# Patient Record
Sex: FEMALE | Race: WHITE | Employment: FULL TIME | URBAN - METROPOLITAN AREA
[De-identification: names, ages, dates, MRNs, and addresses within clinical notes are randomized per-mention and may not be internally consistent; named-entity substitution may affect disease eponyms.]

---

## 2017-01-09 ENCOUNTER — OFFICE VISIT (OUTPATIENT)
Dept: OPHTHALMOLOGY | Facility: CLINIC | Age: 32
End: 2017-01-09
Attending: OPHTHALMOLOGY
Payer: COMMERCIAL

## 2017-01-09 DIAGNOSIS — H40.51X2 GLAUCOMA ASSOCIATED WITH OCULAR DISORDER, RIGHT, MODERATE STAGE: ICD-10-CM

## 2017-01-09 PROCEDURE — 99212 OFFICE O/P EST SF 10 MIN: CPT | Mod: ZF

## 2017-01-09 ASSESSMENT — CUP TO DISC RATIO: OS_RATIO: 0.1

## 2017-01-09 ASSESSMENT — SLIT LAMP EXAM - LIDS
COMMENTS: NORMAL
COMMENTS: NORMAL

## 2017-01-09 ASSESSMENT — REFRACTION_WEARINGRX
OS_CYLINDER: +0.25
OS_AXIS: 022
OD_SPHERE: +0.00
OS_SPHERE: -4.75
OD_CYLINDER: +0.00
OD_AXIS: 0

## 2017-01-09 ASSESSMENT — VISUAL ACUITY
OS_CC: 20/20
METHOD: SNELLEN - LINEAR
CORRECTION_TYPE: GLASSES

## 2017-01-09 ASSESSMENT — TONOMETRY
OD_IOP_MMHG: 18
OS_IOP_MMHG: 18
IOP_METHOD: APPLANATION

## 2017-01-09 NOTE — PROGRESS NOTES
30 year old with congenital cataract right eye and aphakic glaucoma  Trabeculectomy right eye age 12-14  Current records do not go far enough back to cover surgeries done on right eye   Has been on drops right eye for years with good intraocular pressure and stable visual field   Drops stopped for successful pregnancy, still nursing, daughter about 13 months old  She will continue off drops (Alphagan twice a day and Timolol once a day right eye )   RV 6 months with LVC right eye and GTOP left eye     Attending Physician Attestation:  Complete documentation of historical and exam elements from today's encounter can be found in the full encounter summary report (not reduplicated in this progress note). I personally obtained the chief complaint(s) and history of present illness. I confirmed and edited asnecessary the review of systems, past medical/surgical history, family history, social history, and examination findings as documented by others; and I examined the patient myself. I personally reviewed the relevant tests, images, and reports as documented above. I formulated and edited as necessary the assessment and plan and discussed the findings and management plan with the patient and family.  - Marcela Gross MD 3:06 PM 1/9/2017

## 2017-01-09 NOTE — NURSING NOTE
Chief Complaints and History of Present Illnesses   Patient presents with     Glaucoma Follow Up     HPI    Last Eye Exam:  10/23/15   Affected eye(s):  Both   Symptoms:        Duration:  1 year   Frequency:  Constant       Do you have eye pain now?:  No      Comments:  Pt returns for 1 year follow up. Pt notes that she has not been in last year, had baby ( girl ) and ready to get back on track with eyes. Vision remains stable, denies any visual changes or complaints. BE feeling good and comfortable, currently not using any drops. YUE CURRIE, COA 2:05 PM 01/09/2017

## 2017-07-10 ENCOUNTER — OFFICE VISIT (OUTPATIENT)
Dept: OPHTHALMOLOGY | Facility: CLINIC | Age: 32
End: 2017-07-10
Attending: OPHTHALMOLOGY
Payer: COMMERCIAL

## 2017-07-10 DIAGNOSIS — H40.51X2 GLAUCOMA ASSOCIATED WITH OCULAR DISORDER, RIGHT, MODERATE STAGE: ICD-10-CM

## 2017-07-10 DIAGNOSIS — H40.1190: Primary | ICD-10-CM

## 2017-07-10 PROCEDURE — 99213 OFFICE O/P EST LOW 20 MIN: CPT | Mod: ZF

## 2017-07-10 PROCEDURE — 92083 EXTENDED VISUAL FIELD XM: CPT | Mod: ZF | Performed by: OPHTHALMOLOGY

## 2017-07-10 RX ORDER — ETONOGESTREL AND ETHINYL ESTRADIOL VAGINAL RING .015; .12 MG/D; MG/D
1 RING VAGINAL
COMMUNITY
Start: 2017-06-29 | End: 2018-06-29

## 2017-07-10 ASSESSMENT — TONOMETRY
OD_IOP_MMHG: 21
IOP_METHOD: APPLANATION
OS_IOP_MMHG: 18

## 2017-07-10 ASSESSMENT — VISUAL ACUITY
OS_CC: 20/20
METHOD: SNELLEN - LINEAR
CORRECTION_TYPE: GLASSES

## 2017-07-10 ASSESSMENT — SLIT LAMP EXAM - LIDS
COMMENTS: NORMAL
COMMENTS: NORMAL

## 2017-07-10 ASSESSMENT — CONF VISUAL FIELD
OD_INFERIOR_NASAL_RESTRICTION: 3
OS_NORMAL: 1
OD_INFERIOR_TEMPORAL_RESTRICTION: 3
OD_SUPERIOR_TEMPORAL_RESTRICTION: 3
OD_SUPERIOR_NASAL_RESTRICTION: 3

## 2017-07-10 ASSESSMENT — CUP TO DISC RATIO: OS_RATIO: 0.1

## 2017-07-10 ASSESSMENT — REFRACTION_WEARINGRX
OD_SPHERE: +0.00
OS_SPHERE: -4.75
OS_AXIS: 022
OD_CYLINDER: +0.00
OD_AXIS: 0
OS_CYLINDER: +0.25

## 2017-07-10 NOTE — MR AVS SNAPSHOT
After Visit Summary   7/10/2017    Darline Schuler    MRN: [id number]           Patient Information     Date Of Birth          1985        Visit Information        Provider Department      7/10/2017 8:45 AM [facility]a [facility] MD [facility]        Today's Diagnoses     Glaucoma primary, open angle    -  1    Glaucoma associated with ocular disorder, right, moderate stage [H40.51X2]           Follow-ups after your visit        Follow-up notes from your care team     Return in about 6 months (around 1/10/2018) for visual field GTOP os and LVC od .      Your next 10 appointments already scheduled     Mihir 15, 2018  8:30 AM CST   RETURN GLAUCOMA with Marcela Gross MD   Eye Clinic ([facility])    [address]  [address]  [address]  [place] [facility] [id number] [phone]              Who to contact     Please call your clinic at [phone] to:    Ask questions about your health    Make or cancel appointments    Discuss your medicines    Learn about your test results    Speak to your doctor   If you have compliments or concerns about an experience at your clinic, or if you wish to file a complaint, please contact [facility] Patient Relations at [phone] or email us at [email or web address]         Additional Information About Your Visit        MyChart Information     [employer] is an electronic gateway that provides easy, online access to your medical records. With [employer], you can request a clinic appointment, read your test results, renew a prescription or communicate with your care team.     To sign up for [employer] visit the website at [email or web address]   You will be asked to enter the access code listed below, as well as some personal information. Please follow the directions to create your username and password.     Your access code is: [email or web address]  Expires: 2017  6:30 AM     Your access code will  in 90  days. If you need help or a new code, please contact your [facility] or call [phone] for assistance.        Care EveryWhere ID     This is your Care EveryWhere ID. This could be used by other organizations to access your [place] medical records  [id number]         Blood Pressure from Last 3 Encounters:   04/16/14 109/67    Weight from Last 3 Encounters:   04/16/14 80.7 kg (178 lb)              We Performed the Following     Glaucoma Top OD     Low Vision Central OS        Primary Care Provider    None       No address on file        Equal Access to Services     [facility]: Hadii aad ku hadasho Soomaali, waaxda luqadaha, qaybta kaalmada adeegyada, waxay idiin hayaan adeeg sha mckeon . So [facility] [phone].    ATENCIÓN: Si habla español, tiene a burton disposición servicios gratuitos de asistencia lingüística. Llame al [phone].    We comply with applicable federal civil rights laws and [place] laws. We do not discriminate on the basis of race, color, national origin, age, disability sex, sexual orientation or gender identity.            Thank you!     Thank you for choosing [facility]  for your care. Our goal is always to provide you with excellent care. Hearing back from our patients is one way we can continue to improve our services. Please take a few minutes to complete the written survey that you may receive in the mail after your visit with us. Thank you!             Your Updated Medication List - Protect others around you: Learn how to safely use, store and throw away your medicines at www.disposemymeds.org.          This list is accurate as of: 7/10/17  9:38 AM.  Always use your most recent med list.                   Brand Name Dispense Instructions for use Diagnosis    etonogestrel-ethinyl estradiol 0.12-0.015 MG/24HR vaginal ring    NUVARING     Place 1 each vaginally

## 2017-07-10 NOTE — NURSING NOTE
Chief Complaints and History of Present Illnesses   Patient presents with     Follow Up For     Glaucoma      HPI    Affected eye(s):  Both   Symptoms:        Frequency:  Constant       Do you have eye pain now?:  No      Comments:  States va is the same since last visit  +dry but has not changed  Georgina Houston COT 8:39 AM July 10, 2017

## 2017-07-10 NOTE — PROGRESS NOTES
Congenital cataract right eye and aphakic glaucoma  Trabeculectomy right eye age 12-14  Current records do not go far enough back to cover surgeries done on right eye   Has been off drops right eye with good intraocular pressure and stable visual field   Drops stopped for successful pregnancy  She will continue off drops (Alphagan twice a day and Timolol once a day right eye in past)   RV 6 months with LVC right eye and GTOP left eye     Attending Physician Attestation:  Complete documentation of historical and exam elements from today's encounter can be found in the full encounter summary report (not reduplicated in this progress note). I personally obtained the chief complaint(s) and history of present illness. I confirmed and edited asnecessary the review of systems, past medical/surgical history, family history, social history, and examination findings as documented by others; and I examined the patient myself. I personally reviewed the relevant tests, images, and reports as documented above. I formulated and edited as necessary the assessment and plan and discussed the findings and management plan with the patient and family.  - Marcela Gross MD 9:21 AM 7/10/2017